# Patient Record
Sex: FEMALE | Race: BLACK OR AFRICAN AMERICAN | Employment: OTHER | URBAN - METROPOLITAN AREA
[De-identification: names, ages, dates, MRNs, and addresses within clinical notes are randomized per-mention and may not be internally consistent; named-entity substitution may affect disease eponyms.]

---

## 2021-04-15 ENCOUNTER — TRANSCRIBE ORDER (OUTPATIENT)
Dept: REGISTRATION | Age: 71
End: 2021-04-15

## 2021-04-15 ENCOUNTER — HOSPITAL ENCOUNTER (OUTPATIENT)
Dept: LAB | Age: 71
Discharge: HOME | End: 2021-04-15
Payer: MEDICARE

## 2021-04-15 ENCOUNTER — HOSPITAL ENCOUNTER (OUTPATIENT)
Dept: GENERAL RADIOLOGY | Age: 71
Discharge: HOME | End: 2021-04-15
Payer: MEDICARE

## 2021-04-15 ENCOUNTER — HOSPITAL ENCOUNTER (OUTPATIENT)
Dept: LAB | Age: 71
Discharge: HOME | End: 2021-04-15

## 2021-04-15 DIAGNOSIS — M15.0 PRIMARY GENERALIZED HYPERTROPHIC OSTEOARTHROSIS: ICD-10-CM

## 2021-04-15 DIAGNOSIS — E03.9 PRIMARY HYPOTHYROIDISM: Primary | ICD-10-CM

## 2021-04-15 DIAGNOSIS — M17.9 DEGENERATIVE ARTHRITIS OF KNEE: ICD-10-CM

## 2021-04-15 DIAGNOSIS — E55.9 VITAMIN D DEFICIENCY: ICD-10-CM

## 2021-04-15 DIAGNOSIS — E03.9 PRIMARY HYPOTHYROIDISM: ICD-10-CM

## 2021-04-15 DIAGNOSIS — E78.2 MIXED HYPERLIPIDEMIA: ICD-10-CM

## 2021-04-15 DIAGNOSIS — M16.0 OSTEOARTHRITIS OF BOTH HIPS: ICD-10-CM

## 2021-04-15 DIAGNOSIS — M54.16 LUMBAR RADICULOPATHY: ICD-10-CM

## 2021-04-15 DIAGNOSIS — M54.16 LUMBAR RADICULOPATHY: Primary | ICD-10-CM

## 2021-04-15 LAB — XX-LABCORP SPECIMEN COL,LCBCF: NORMAL

## 2021-04-15 PROCEDURE — 73564 X-RAY EXAM KNEE 4 OR MORE: CPT

## 2021-04-15 PROCEDURE — 73521 X-RAY EXAM HIPS BI 2 VIEWS: CPT

## 2021-04-15 PROCEDURE — 99001 SPECIMEN HANDLING PT-LAB: CPT

## 2021-04-15 PROCEDURE — 72110 X-RAY EXAM L-2 SPINE 4/>VWS: CPT

## 2021-09-01 ENCOUNTER — TRANSCRIBE ORDER (OUTPATIENT)
Dept: SCHEDULING | Age: 71
End: 2021-09-01

## 2021-09-01 DIAGNOSIS — M85.80 OSTEOPENIA: Primary | ICD-10-CM

## 2021-09-02 ENCOUNTER — TRANSCRIBE ORDER (OUTPATIENT)
Dept: SCHEDULING | Age: 71
End: 2021-09-02

## 2021-09-02 DIAGNOSIS — M86.9 SAPHO SYNDROME (HCC): Primary | ICD-10-CM

## 2021-09-02 DIAGNOSIS — M85.80 SAPHO SYNDROME (HCC): Primary | ICD-10-CM

## 2021-09-02 DIAGNOSIS — M81.0 SENILE OSTEOPOROSIS: ICD-10-CM

## 2021-09-02 DIAGNOSIS — L70.9 SAPHO SYNDROME (HCC): Primary | ICD-10-CM

## 2021-09-02 DIAGNOSIS — L40.3 SAPHO SYNDROME (HCC): Primary | ICD-10-CM

## 2021-09-02 DIAGNOSIS — M85.80 MELORHEOSTOSIS: Primary | ICD-10-CM

## 2021-09-02 DIAGNOSIS — M65.9 SAPHO SYNDROME (HCC): Primary | ICD-10-CM

## 2021-09-02 DIAGNOSIS — Z12.31 SCREENING MAMMOGRAM FOR HIGH-RISK PATIENT: ICD-10-CM

## 2021-10-12 ENCOUNTER — HOSPITAL ENCOUNTER (OUTPATIENT)
Dept: MAMMOGRAPHY | Age: 71
Discharge: HOME | End: 2021-10-12
Attending: FAMILY MEDICINE
Payer: MEDICARE

## 2021-10-12 ENCOUNTER — HOSPITAL ENCOUNTER (OUTPATIENT)
Dept: BONE DENSITY | Age: 71
Discharge: HOME | End: 2021-10-12
Attending: FAMILY MEDICINE
Payer: MEDICARE

## 2021-10-12 DIAGNOSIS — M81.0 SENILE OSTEOPOROSIS: ICD-10-CM

## 2021-10-12 DIAGNOSIS — Z12.31 SCREENING MAMMOGRAM FOR HIGH-RISK PATIENT: ICD-10-CM

## 2021-10-12 DIAGNOSIS — M85.80 MELORHEOSTOSIS: ICD-10-CM

## 2021-10-12 PROCEDURE — 77080 DXA BONE DENSITY AXIAL: CPT

## 2021-10-12 PROCEDURE — 77063 BREAST TOMOSYNTHESIS BI: CPT

## 2021-12-09 ENCOUNTER — TRANSCRIBE ORDER (OUTPATIENT)
Dept: SCHEDULING | Age: 71
End: 2021-12-09

## 2021-12-09 DIAGNOSIS — R92.8 ABNORMAL MAMMOGRAM: Primary | ICD-10-CM

## 2021-12-13 ENCOUNTER — HOSPITAL ENCOUNTER (OUTPATIENT)
Dept: MAMMOGRAPHY | Age: 71
Discharge: HOME | End: 2021-12-13
Attending: FAMILY MEDICINE
Payer: MEDICARE

## 2021-12-13 ENCOUNTER — HOSPITAL ENCOUNTER (OUTPATIENT)
Dept: ULTRASOUND IMAGING | Age: 71
Discharge: HOME | End: 2021-12-13
Attending: FAMILY MEDICINE
Payer: MEDICARE

## 2021-12-13 DIAGNOSIS — R92.8 ABNORMAL MAMMOGRAM: ICD-10-CM

## 2021-12-13 PROCEDURE — 77062 BREAST TOMOSYNTHESIS BI: CPT

## 2021-12-13 PROCEDURE — 76642 ULTRASOUND BREAST LIMITED: CPT

## 2022-06-08 ENCOUNTER — TRANSCRIBE ORDER (OUTPATIENT)
Dept: SCHEDULING | Age: 72
End: 2022-06-08

## 2022-06-08 DIAGNOSIS — R92.8 ABNORMAL MAMMOGRAM: Primary | ICD-10-CM

## 2022-06-30 ENCOUNTER — HOSPITAL ENCOUNTER (OUTPATIENT)
Dept: ULTRASOUND IMAGING | Age: 72
Discharge: HOME | End: 2022-06-30
Attending: FAMILY MEDICINE
Payer: MEDICARE

## 2022-06-30 ENCOUNTER — HOSPITAL ENCOUNTER (OUTPATIENT)
Dept: MAMMOGRAPHY | Age: 72
Discharge: HOME | End: 2022-06-30
Attending: FAMILY MEDICINE
Payer: MEDICARE

## 2022-06-30 DIAGNOSIS — R92.8 ABNORMAL MAMMOGRAM: ICD-10-CM

## 2022-06-30 PROCEDURE — 76642 ULTRASOUND BREAST LIMITED: CPT

## 2022-06-30 PROCEDURE — 77065 DX MAMMO INCL CAD UNI: CPT

## 2023-10-24 ENCOUNTER — HOSPITAL ENCOUNTER (OUTPATIENT)
Facility: HOSPITAL | Age: 73
Discharge: HOME | End: 2023-10-27

## 2023-10-24 DIAGNOSIS — Z12.31 VISIT FOR SCREENING MAMMOGRAM: ICD-10-CM

## 2024-02-12 ENCOUNTER — HOSPITAL ENCOUNTER (OUTPATIENT)
Facility: HOSPITAL | Age: 74
Discharge: HOME | End: 2024-02-15
Payer: MEDICARE

## 2024-02-12 VITALS — BODY MASS INDEX: 35.44 KG/M2 | WEIGHT: 200 LBS | HEIGHT: 63 IN

## 2024-02-12 DIAGNOSIS — Z12.31 VISIT FOR SCREENING MAMMOGRAM: ICD-10-CM

## 2024-02-12 DIAGNOSIS — Z79.83 PERSONAL HISTORY OF ONGOING TREATMENT WITH ALENDRONATE (FOSAMAX): ICD-10-CM

## 2024-02-12 PROCEDURE — 77080 DXA BONE DENSITY AXIAL: CPT

## 2024-02-12 PROCEDURE — 77063 BREAST TOMOSYNTHESIS BI: CPT

## 2024-10-09 ENCOUNTER — OFFICE VISIT (OUTPATIENT)
Age: 74
End: 2024-10-09
Payer: MEDICARE

## 2024-10-09 VITALS
HEIGHT: 63 IN | HEART RATE: 61 BPM | DIASTOLIC BLOOD PRESSURE: 78 MMHG | BODY MASS INDEX: 35.26 KG/M2 | TEMPERATURE: 97.6 F | SYSTOLIC BLOOD PRESSURE: 142 MMHG | OXYGEN SATURATION: 99 % | WEIGHT: 199 LBS

## 2024-10-09 DIAGNOSIS — M54.50 LUMBAR PAIN: Primary | ICD-10-CM

## 2024-10-09 DIAGNOSIS — M47.816 LUMBAR FACET ARTHROPATHY: ICD-10-CM

## 2024-10-09 DIAGNOSIS — M41.26 OTHER IDIOPATHIC SCOLIOSIS, LUMBAR REGION: ICD-10-CM

## 2024-10-09 DIAGNOSIS — M51.369 DEGENERATION OF INTERVERTEBRAL DISC OF LUMBAR REGION, UNSPECIFIED WHETHER PAIN PRESENT: ICD-10-CM

## 2024-10-09 DIAGNOSIS — M62.838 MUSCLE SPASM: ICD-10-CM

## 2024-10-09 PROCEDURE — 72110 X-RAY EXAM L-2 SPINE 4/>VWS: CPT | Performed by: PHYSICAL MEDICINE & REHABILITATION

## 2024-10-09 PROCEDURE — 99203 OFFICE O/P NEW LOW 30 MIN: CPT | Performed by: PHYSICAL MEDICINE & REHABILITATION

## 2024-10-09 PROCEDURE — 1123F ACP DISCUSS/DSCN MKR DOCD: CPT | Performed by: PHYSICAL MEDICINE & REHABILITATION

## 2024-10-09 RX ORDER — MELOXICAM 7.5 MG/1
TABLET ORAL
Qty: 30 TABLET | Refills: 1 | Status: SHIPPED | OUTPATIENT
Start: 2024-10-09

## 2024-10-09 RX ORDER — METHYLPREDNISOLONE 4 MG
TABLET, DOSE PACK ORAL
Qty: 1 KIT | Refills: 0 | Status: SHIPPED | OUTPATIENT
Start: 2024-10-09 | End: 2024-10-15

## 2024-10-09 NOTE — PROGRESS NOTES
Geraldo Presley presents today for   Chief Complaint   Patient presents with    Back Pain     LBP RLE       Is someone accompanying this pt? no    Is the patient using any DME equipment during OV? no        Coordination of Care:  1. Have you been to the ER, urgent care clinic since your last visit? no  Hospitalized since your last visit? no    2. Have you seen or consulted any other health care providers outside of the [facility] since your last visit? no Include any pap smears or colon screening. no

## 2024-10-09 NOTE — PROGRESS NOTES
[facility]  [address] [place] [address]., [address]  [place] [address]  Phone: [phone]  Fax: [phone]    NEW PATIENT  Patient's YOB: 1950    ASSESSMENT   Geraldo was seen today for back pain.    Diagnoses and all orders for this visit:    Lumbar pain  -     AMB POC XRAY, SPINE, LUMBOSACRAL; 4+  -     Ambulatory referral to Physical Therapy  -     methylPREDNISolone (MEDROL DOSEPACK) 4 MG tablet; Take by mouth.    Degeneration of intervertebral disc of lumbar region, unspecified whether pain present  -     Ambulatory referral to Physical Therapy    Lumbar facet arthropathy  -     Ambulatory referral to Physical Therapy  -     methylPREDNISolone (MEDROL DOSEPACK) 4 MG tablet; Take by mouth.  -     meloxicam (MOBIC) 7.5 MG tablet; Take 1 daily as needed for pain and take with food    Muscle spasm  -     Ambulatory referral to Physical Therapy    Other idiopathic scoliosis, lumbar region  -     Ambulatory referral to Physical Therapy         IMPRESSION AND PLAN:  Geraldo Presley is a 74 y.o. female with history of lower back and RLE pain. Patient has not attended PT. She has taken Tylenol in the past with temporary relief.     Patient was given information on the RFA procedure as well as low back arthritis exercises and muscle conditioning exercises.   Discussed treatment options with the patient including advanced imaging studies, injections, RFA procedure, at home exercises, and medications.   Obtained and reviewed Lumbar 4V X-rays with patient at length.  Ordered a referral to [facility]. Transition to land when appropriate.  Prescribed Mobic 7.5 mg PRN for pain.  Prescribed an MDP 4 mg PRN for inflammatory pain.  Ms. Presley has a reminder for a \"due or due soon\" health maintenance. I have asked that she contact her primary care provider, Ivory Jason MD, for follow-up on this health maintenance.   demonstrated consistency with prescribing.     Return in

## 2024-11-04 ENCOUNTER — HOSPITAL ENCOUNTER (OUTPATIENT)
Facility: HOSPITAL | Age: 74
Setting detail: RECURRING SERIES
Discharge: HOME | End: 2024-11-07
Payer: MEDICARE

## 2024-11-04 PROCEDURE — 97162 PT EVAL MOD COMPLEX 30 MIN: CPT

## 2024-11-04 NOTE — PROGRESS NOTES
PT DAILY TREATMENT NOTE/LUMBAR EVAL     Patient Name: Geraldo Presley    Date: 2024    : 1950  Insurance: Payor: [facility] MEDICARE / Plan: [employer] ESSENTIAL/PLUS / Product Type: *No Product type* /      Patient  verified yes     Visit #   Current / Total 1 24   Time   In / Out 11:06 11:50   Pain   In / Out 5 5   Subjective Functional Status/Changes: See Subjective Summary       Treatment Area: Other low back pain [M54.59]  Spondylosis without myelopathy or radiculopathy, lumbar region [M47.816]  SUBJECTIVE    Subjective functional status/changes:     Chief Complaint: low back pain with right LE symptoms  History/Mechanism of Injury: insidious onset with progressive worsening to includes intermittent right LE symptoms. Right LE symptoms described at aches/tingling with numbness in the foot  Current Symptoms/Deficits: limited standing/walking tolerance, pain/difficulty with ADLs, sleep disturbances, difficulty with stairs, uses motorized cart in grocery stores  Pain-  Current: 5/10     Worst: 10/10   Best: 5/10  Previous Treatment/Compliance: none reported  Mobility Devices: none reported  PMHx/Surgical Hx: hypothyroid, scoliosis     Diagnostic Tests: [] Lab work [] X-rays    [] CT [] MRI     [] Other:  Results:    Work Hx: Retired  Living Situation: Lives with partner in a 1 story with 4 MARYLOU with rails  Household Modifications: none reported  Hobbies: gardening, recreational walking  PLOF: Previously functionally independent with ADLs, enjoyed gardening and recreational walking  Pt Goals: \"to be able to walk further distances\" & \"to feel less pain \"    General Health:  Red Flags Indicated? [] Yes    [] No  [] Yes [x] No Recent weight change (If yes, due to dieting? [] Yes  [] No)   [] Yes [x] No Weakness in legs during walking  [] Yes [x] No Unremitting pain at night  [] Yes [x] No Abdominal pain or problems  [] Yes [x] No Rectal bleeding  [x] Yes [] No Feet more cold or painful in cold

## 2024-11-04 NOTE — PROGRESS NOTES
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.816]   Onset Date: 10/09/24 - referral date Payor Source: Payor: [employer] MEDICARE / Plan: [employer] ESSENTIAL/PLUS / Product Type: *No Product type* /    Referral Source: Riddhi Sosa MD Start of Care (SOC): 2024   Prior Hospitalization: See medical history Provider #: [id number]   Prior Level of Function: Previously functionally independent with ADLs, enjoyed gardening and recreational walking, Lives with partner in a 1 story with 4 MARYLOU with rails, Retired    Comorbidities: Musculoskeletal disorders and Other: hypothyroid, scoliosis     Assessment / key information:  Pt is a 74 y.o. female who presents with c/o progressive worsening of chronic low back pain to include right LE symptoms of pain, numbness, and tingling. Low back pain occurred with insidious onset several years ago, but pt reports onset of right LE symptoms within the last year. Functional deficits include: limited standing/walking tolerance, pain/difficulty with ADLs, sleep disturbances, difficulty with stairs, and uses motorized cart in grocery stores. Upon exam, pt exhibited reduced lumbar AROM, decreased bilateral LE strength, reduced transfer performance, impaired gait on even surfaces and stairs, and TTP. Pt displayed directional preference for flexion. Patient scored 26% limited on ARI indicating moderately decreased function and quality of life. Pt would benefit from skilled PT to address above deficits, limitations, and impairments, to

## 2024-11-14 ENCOUNTER — APPOINTMENT (OUTPATIENT)
Facility: HOSPITAL | Age: 74
End: 2024-11-14

## 2024-11-21 ENCOUNTER — HOSPITAL ENCOUNTER (OUTPATIENT)
Facility: HOSPITAL | Age: 74
Setting detail: RECURRING SERIES
Discharge: HOME | End: 2024-11-24

## 2024-11-21 PROCEDURE — 97113 AQUATIC THERAPY/EXERCISES: CPT

## 2024-11-21 PROCEDURE — 97530 THERAPEUTIC ACTIVITIES: CPT

## 2024-11-26 ENCOUNTER — HOSPITAL ENCOUNTER (OUTPATIENT)
Facility: HOSPITAL | Age: 74
Setting detail: RECURRING SERIES
Discharge: HOME | End: 2024-11-29

## 2024-11-26 ENCOUNTER — OFFICE VISIT (OUTPATIENT)
Age: 74
End: 2024-11-26
Payer: MEDICARE

## 2024-11-26 VITALS
TEMPERATURE: 98 F | HEIGHT: 63 IN | OXYGEN SATURATION: 98 % | BODY MASS INDEX: 34.2 KG/M2 | SYSTOLIC BLOOD PRESSURE: 112 MMHG | WEIGHT: 193 LBS | DIASTOLIC BLOOD PRESSURE: 62 MMHG | HEART RATE: 65 BPM

## 2024-11-26 DIAGNOSIS — M62.838 MUSCLE SPASM: ICD-10-CM

## 2024-11-26 DIAGNOSIS — M16.0 PRIMARY OSTEOARTHRITIS OF BOTH HIPS: ICD-10-CM

## 2024-11-26 DIAGNOSIS — M54.50 LUMBAR PAIN: ICD-10-CM

## 2024-11-26 DIAGNOSIS — M25.511 RIGHT SHOULDER PAIN, UNSPECIFIED CHRONICITY: ICD-10-CM

## 2024-11-26 DIAGNOSIS — M51.369 DEGENERATION OF INTERVERTEBRAL DISC OF LUMBAR REGION, UNSPECIFIED WHETHER PAIN PRESENT: ICD-10-CM

## 2024-11-26 DIAGNOSIS — M47.816 LUMBAR FACET ARTHROPATHY: Primary | ICD-10-CM

## 2024-11-26 DIAGNOSIS — M17.0 PRIMARY OSTEOARTHRITIS OF BOTH KNEES: ICD-10-CM

## 2024-11-26 PROCEDURE — 1125F AMNT PAIN NOTED PAIN PRSNT: CPT | Performed by: PHYSICAL MEDICINE & REHABILITATION

## 2024-11-26 PROCEDURE — 1160F RVW MEDS BY RX/DR IN RCRD: CPT | Performed by: PHYSICAL MEDICINE & REHABILITATION

## 2024-11-26 PROCEDURE — 1123F ACP DISCUSS/DSCN MKR DOCD: CPT | Performed by: PHYSICAL MEDICINE & REHABILITATION

## 2024-11-26 PROCEDURE — 99213 OFFICE O/P EST LOW 20 MIN: CPT | Performed by: PHYSICAL MEDICINE & REHABILITATION

## 2024-11-26 PROCEDURE — 97113 AQUATIC THERAPY/EXERCISES: CPT

## 2024-11-26 PROCEDURE — 1159F MED LIST DOCD IN RCRD: CPT | Performed by: PHYSICAL MEDICINE & REHABILITATION

## 2024-11-26 RX ORDER — ERGOCALCIFEROL 1.25 MG/1
50000 CAPSULE, LIQUID FILLED ORAL WEEKLY
COMMUNITY
Start: 2024-10-27

## 2024-11-26 RX ORDER — ATORVASTATIN CALCIUM 20 MG/1
20 TABLET, FILM COATED ORAL NIGHTLY
COMMUNITY
Start: 2024-11-02

## 2024-11-26 NOTE — PROGRESS NOTES
Geraldo Presley presents today for   Chief Complaint   Patient presents with    Lower Back Pain       Is someone accompanying this pt? no    Is the patient using any DME equipment during OV? no    Coordination of Care:  1. Have you been to the ER, urgent care clinic since your last visit? no  Hospitalized since your last visit? no    2. Have you seen or consulted any other health care providers outside of the [facility] since your last visit? Yes, physical therapy  Include any pap smears or colon screening. no

## 2024-11-26 NOTE — PROGRESS NOTES
[facility]  [address] [place] [address]., [address]  [place] [address]  Phone: [phone]  Fax: [phone]    Patient's YOB: 1950    ASSESSMENT     Lumbar facet arthropathy    2  Lumbar Pain    3.  Degeneration of intervertebral disc of lumbar region, unspecified whether pain present    4.  Muscle spasm    5.  Right shoulder pain    6.  Primary osteoarthritis of both hips    7. Primary osteoarthritis of both knees        IMPRESSION AND PLAN:  Geraldo Presley is a 74 y.o. female with history of  lower back and RLE pain. Patient has not attended PT. She has taken Tylenol in the past with temporary relief.     Patient received a referral for a Lumbar MRI-- pt has had PT, on Mobic and requests [facility] location  Ms. Presley has a reminder for a \"due or due soon\" health maintenance. I have asked that she contact her primary care provider, Ivory Jason MD, for follow-up on this health maintenance.   demonstrated consistency with prescribing.   Pt to follow up in 6-8 weeks for PT and Diagnostic follow up  Pt will continue with PT at this time  Pt may continue with Mobic 15 mg for inflammatory pain as needed        HISTORY OF PRESENT ILLNESS:  Geraldo Presley is a 74 y.o.  female who presents to the office today for MRI follow up. At the time of her last OV with me (10/09/2024), Patient received a referral for [facility]. She was prescribed Mobic 7.5 mg PRN for pain and MDP 4 mg PRN for inflammatory pain.     Patient states she is still continue with physical therapy.  She is doing some aqua therapy through [facility] in fact she had some this morning.  She states she still having right radicular symptoms.  She has numbness in her right calf.  She also notes some cramps on the left side.  She has been using the meloxicam 7.5 mg intermittently and this has been somewhat helpful.  She did not use the Medrol Dosepak.  Overall she feels that she is mildly improved from her

## 2024-12-03 ENCOUNTER — HOSPITAL ENCOUNTER (OUTPATIENT)
Facility: HOSPITAL | Age: 74
Setting detail: RECURRING SERIES
Discharge: HOME | End: 2024-12-06
Payer: MEDICARE

## 2024-12-03 PROCEDURE — 97113 AQUATIC THERAPY/EXERCISES: CPT

## 2024-12-03 PROCEDURE — 97530 THERAPEUTIC ACTIVITIES: CPT

## 2024-12-10 ENCOUNTER — HOSPITAL ENCOUNTER (OUTPATIENT)
Facility: HOSPITAL | Age: 74
Setting detail: RECURRING SERIES
Discharge: HOME | End: 2024-12-13
Payer: MEDICARE

## 2024-12-10 PROCEDURE — 97113 AQUATIC THERAPY/EXERCISES: CPT

## 2024-12-17 ENCOUNTER — APPOINTMENT (OUTPATIENT)
Facility: HOSPITAL | Age: 74
End: 2024-12-17
Payer: MEDICARE

## 2024-12-26 ENCOUNTER — HOSPITAL ENCOUNTER (OUTPATIENT)
Facility: HOSPITAL | Age: 74
Setting detail: RECURRING SERIES
Discharge: HOME | End: 2024-12-29
Payer: MEDICARE

## 2024-12-26 PROCEDURE — 97530 THERAPEUTIC ACTIVITIES: CPT

## 2024-12-26 PROCEDURE — 97113 AQUATIC THERAPY/EXERCISES: CPT

## 2025-01-23 ENCOUNTER — CLINICAL DOCUMENTATION (OUTPATIENT)
Age: 75
End: 2025-01-23

## 2025-01-23 NOTE — PROGRESS NOTES
Information ( clinical notes ) faxed over to [facility] [employer] @ [phone] Confirmation received and placed to be scanned.   Tracking # [id number]

## 2025-03-12 NOTE — PROGRESS NOTES
[facility]  [address] [place] [address]., [address]  [place] [address]  Phone: [phone]  Fax: [phone]    Patient's YOB: 1950    ASSESSMENT   Geraldo was seen today for back problem, pain and back pain.    Diagnoses and all orders for this visit:    Lumbar radicular pain  -     MRI LUMBAR SPINE WO CONTRAST; Future    Lumbar facet arthropathy  -     MRI LUMBAR SPINE WO CONTRAST; Future    Muscle spasm  -     MRI LUMBAR SPINE WO CONTRAST; Future    Claustrophobia  -     MRI LUMBAR SPINE WO CONTRAST; Future         IMPRESSION AND PLAN:  Geraldo Presley is a 74 y.o. female with history of lower back and RLE,  complains of back pain. Since last visit, pt feels her symptoms have continued at the same level. She has taken Tylenol in the past with temporary relief. Patient is currently taking Mobic 7.5mg QD. Patient has completed PT.     Ordered Lumbar Spine MRI WO Contrast for surgical and injection evaluation.  Reviewed Lumbosacral Spine 4V XR images from 10/9/24. Advised patient to obtain MRI disc for follow-up visit.   Ms. Presley has a reminder for a \"due or due soon\" health maintenance. I have asked that she contact her primary care provider, Ivory Jason MD, for follow-up on this health maintenance.   demonstrated consistency with prescribing.  Patient defers from medication at this time and discussed proceeding after evaluation of Lumbar Spine MRI.  Discussed Valium Rx for MRI - patient deferred.     Return in about 6 weeks (around 4/29/2025) for Diagnostic follow up.      HISTORY OF PRESENT ILLNESS:  Geraldo Presley is a 74 y.o. female who presents to the office today for a medication and PT follow up. At her last OV (11/26/24), Ordered Lumbar Spine MRI WO Contrast.     Patient presents here for a diagnostic follow-up. Patient states she did not undergo her previously ordered Lumbar Spine MRI. Patient continues to c/o lower back pain that radiates down

## 2025-03-18 ENCOUNTER — OFFICE VISIT (OUTPATIENT)
Age: 75
End: 2025-03-18
Payer: MEDICARE

## 2025-03-18 VITALS
OXYGEN SATURATION: 96 % | WEIGHT: 194.6 LBS | RESPIRATION RATE: 18 BRPM | TEMPERATURE: 98.1 F | BODY MASS INDEX: 34.48 KG/M2 | HEIGHT: 63 IN | HEART RATE: 72 BPM

## 2025-03-18 DIAGNOSIS — M62.838 MUSCLE SPASM: ICD-10-CM

## 2025-03-18 DIAGNOSIS — M47.816 LUMBAR FACET ARTHROPATHY: ICD-10-CM

## 2025-03-18 DIAGNOSIS — F40.240 CLAUSTROPHOBIA: ICD-10-CM

## 2025-03-18 DIAGNOSIS — M54.16 LUMBAR RADICULAR PAIN: ICD-10-CM

## 2025-03-18 DIAGNOSIS — M54.16 LUMBAR RADICULAR PAIN: Primary | ICD-10-CM

## 2025-03-18 PROCEDURE — 99213 OFFICE O/P EST LOW 20 MIN: CPT | Performed by: PHYSICAL MEDICINE & REHABILITATION

## 2025-03-18 PROCEDURE — 1159F MED LIST DOCD IN RCRD: CPT | Performed by: PHYSICAL MEDICINE & REHABILITATION

## 2025-03-18 PROCEDURE — 1160F RVW MEDS BY RX/DR IN RCRD: CPT | Performed by: PHYSICAL MEDICINE & REHABILITATION

## 2025-03-18 PROCEDURE — 1125F AMNT PAIN NOTED PAIN PRSNT: CPT | Performed by: PHYSICAL MEDICINE & REHABILITATION

## 2025-03-18 PROCEDURE — 1123F ACP DISCUSS/DSCN MKR DOCD: CPT | Performed by: PHYSICAL MEDICINE & REHABILITATION

## 2025-03-18 NOTE — PROGRESS NOTES
Geraldo Presley presents today for   Chief Complaint   Patient presents with    Back Problem    Pain    Back Pain       Is someone accompanying this pt? no    Is the patient using any DME equipment during OV? no    Depression Screening:       No data to display                Learning Assessment:  Failed to redirect to the Timeline version of the [employer].    Abuse Screening:       No data to display                Fall Risk  Failed to redirect to the Timeline version of the [employer] Smart[employer].    OPIOID RISK TOOL  Failed to redirect to the Timeline version of the [employer] Smart[employer].    Coordination of Care:  1. Have you been to the ER, urgent care clinic since your last visit? no  Hospitalized since your last visit? no    2. Have you seen or consulted any other health care providers [facility] since your last visit? no Include any pap smears or colon screening. no

## 2025-04-02 ENCOUNTER — TRANSCRIBE ORDERS (OUTPATIENT)
Facility: HOSPITAL | Age: 75
End: 2025-04-02

## 2025-04-02 DIAGNOSIS — Z12.31 ENCOUNTER FOR SCREENING MAMMOGRAM FOR BREAST CANCER: Primary | ICD-10-CM

## 2025-04-12 ENCOUNTER — HOSPITAL ENCOUNTER (OUTPATIENT)
Facility: HOSPITAL | Age: 75
Discharge: HOME | End: 2025-04-15
Attending: PHYSICAL MEDICINE & REHABILITATION
Payer: MEDICARE

## 2025-04-12 PROCEDURE — 72148 MRI LUMBAR SPINE W/O DYE: CPT

## 2025-04-20 NOTE — PROGRESS NOTES
[facility]  [address] [place] [address]., [address]  [place] [address]  Phone: [phone]  Fax: [phone]    Patient's YOB: 1950    ASSESSMENT   Geraldo was seen today for back pain.    Diagnoses and all orders for this visit:    Lumbar radicular pain  -     methylPREDNISolone (MEDROL DOSEPACK) 4 MG tablet; Take by mouth.  -     gabapentin (NEURONTIN) 100 MG capsule; Take 1 in the evening for 1 week then increase to 2 as directed for neuropathic symptoms    Spinal stenosis of lumbar region with neurogenic claudication  -     methylPREDNISolone (MEDROL DOSEPACK) 4 MG tablet; Take by mouth.    Muscle spasm    Lumbar pain  -     methylPREDNISolone (MEDROL DOSEPACK) 4 MG tablet; Take by mouth.         IMPRESSION AND PLAN:  Geraldo Presley is a 74 y.o. female with history of lower back pain. Since last visit, pt feels her symptoms have worsened.  She has taken Tylenol in the past with temporary relief. Patient is currently taking Mobic 7.5mg QD. Patient utilizes Vitamin D supplements.     Patient was given information on medial branch neurotomy.  Patient was given information on Lumbar ESIs.   Discussed treatment options with the patient including lumbar injections. Patient states hesitance.   Reviewed Lumbar Spine MRI WO Contrast images from 4/12/25 with patient.   Ms. Presley has a reminder for a \"due or due soon\" health maintenance. I have asked that she contact her primary care provider, Ivory Jason MD, for follow-up on this health maintenance.   demonstrated consistency with prescribing.   Rx Gabapentin 100mg - 1 in the evening for 1 week then increase to 2 as directed for neuropathic symptoms.  Ordered Medrol Dosepack 4mg for inflammatory pain.     Return in about 6 weeks (around 6/4/2025) for Medication follow up.      HISTORY OF PRESENT ILLNESS:  Geraldo Presley is a 74 y.o. female who presents to the office today for a diagnostic follow up. At her last

## 2025-04-23 ENCOUNTER — OFFICE VISIT (OUTPATIENT)
Age: 75
End: 2025-04-23
Payer: MEDICARE

## 2025-04-23 VITALS
HEIGHT: 63 IN | WEIGHT: 194 LBS | SYSTOLIC BLOOD PRESSURE: 128 MMHG | HEART RATE: 74 BPM | OXYGEN SATURATION: 97 % | BODY MASS INDEX: 34.38 KG/M2 | TEMPERATURE: 98.2 F | DIASTOLIC BLOOD PRESSURE: 70 MMHG

## 2025-04-23 DIAGNOSIS — M54.16 LUMBAR RADICULAR PAIN: Primary | ICD-10-CM

## 2025-04-23 DIAGNOSIS — M54.50 LUMBAR PAIN: ICD-10-CM

## 2025-04-23 DIAGNOSIS — M48.062 SPINAL STENOSIS OF LUMBAR REGION WITH NEUROGENIC CLAUDICATION: ICD-10-CM

## 2025-04-23 DIAGNOSIS — M62.838 MUSCLE SPASM: ICD-10-CM

## 2025-04-23 PROCEDURE — 99214 OFFICE O/P EST MOD 30 MIN: CPT | Performed by: PHYSICAL MEDICINE & REHABILITATION

## 2025-04-23 PROCEDURE — 1123F ACP DISCUSS/DSCN MKR DOCD: CPT | Performed by: PHYSICAL MEDICINE & REHABILITATION

## 2025-04-23 PROCEDURE — 1159F MED LIST DOCD IN RCRD: CPT | Performed by: PHYSICAL MEDICINE & REHABILITATION

## 2025-04-23 PROCEDURE — 1160F RVW MEDS BY RX/DR IN RCRD: CPT | Performed by: PHYSICAL MEDICINE & REHABILITATION

## 2025-04-23 PROCEDURE — 1125F AMNT PAIN NOTED PAIN PRSNT: CPT | Performed by: PHYSICAL MEDICINE & REHABILITATION

## 2025-04-23 RX ORDER — METHYLPREDNISOLONE 4 MG/1
TABLET ORAL
Qty: 1 KIT | Refills: 0 | Status: SHIPPED | OUTPATIENT
Start: 2025-04-23 | End: 2025-04-29

## 2025-04-23 RX ORDER — GABAPENTIN 100 MG/1
CAPSULE ORAL
Qty: 60 CAPSULE | Refills: 2 | Status: SHIPPED | OUTPATIENT
Start: 2025-04-23 | End: 2025-07-24

## 2025-04-23 NOTE — PROGRESS NOTES
Geraldo Presley presents today for   Chief Complaint   Patient presents with    Back Pain     Back pain is worse       Is someone accompanying this pt? no    Is the patient using any DME equipment during OV? no          Coordination of Care:  1. Have you been to the ER, urgent care clinic since your last visit? no  Hospitalized since your last visit? no    2. Have you seen or consulted any other health care providers outside of the [facility] since your last visit? no Include any pap smears or colon screening. no

## 2025-05-08 ENCOUNTER — HOSPITAL ENCOUNTER (OUTPATIENT)
Facility: HOSPITAL | Age: 75
Discharge: HOME | End: 2025-05-11
Payer: MEDICARE

## 2025-05-08 VITALS — HEIGHT: 62 IN | WEIGHT: 206 LBS | BODY MASS INDEX: 37.91 KG/M2

## 2025-05-08 DIAGNOSIS — Z12.31 ENCOUNTER FOR SCREENING MAMMOGRAM FOR BREAST CANCER: ICD-10-CM

## 2025-05-08 PROCEDURE — 77063 BREAST TOMOSYNTHESIS BI: CPT
